# Patient Record
Sex: MALE | Race: WHITE | NOT HISPANIC OR LATINO | Employment: OTHER | URBAN - METROPOLITAN AREA
[De-identification: names, ages, dates, MRNs, and addresses within clinical notes are randomized per-mention and may not be internally consistent; named-entity substitution may affect disease eponyms.]

---

## 2017-02-14 ENCOUNTER — HOSPITAL ENCOUNTER (EMERGENCY)
Facility: OTHER | Age: 72
Discharge: HOME | End: 2017-02-14
Attending: EMERGENCY MEDICINE
Payer: MEDICARE

## 2017-02-14 VITALS
HEIGHT: 66 IN | OXYGEN SATURATION: 96 % | SYSTOLIC BLOOD PRESSURE: 148 MMHG | BODY MASS INDEX: 24.43 KG/M2 | WEIGHT: 152 LBS | HEART RATE: 71 BPM | TEMPERATURE: 99 F | RESPIRATION RATE: 18 BRPM | DIASTOLIC BLOOD PRESSURE: 80 MMHG

## 2017-02-14 DIAGNOSIS — H93.8X3 EAR CONGESTION, BILATERAL: Primary | ICD-10-CM

## 2017-02-14 DIAGNOSIS — H61.323: ICD-10-CM

## 2017-02-14 DIAGNOSIS — J06.9 VIRAL URI: ICD-10-CM

## 2017-02-14 PROCEDURE — 99283 EMERGENCY DEPT VISIT LOW MDM: CPT

## 2017-02-14 RX ORDER — ISOSORBIDE DINITRATE 30 MG/1
20 TABLET ORAL DAILY
COMMUNITY

## 2017-02-14 RX ORDER — NAPROXEN SODIUM 220 MG/1
81 TABLET, FILM COATED ORAL DAILY
COMMUNITY

## 2017-02-14 RX ORDER — GLIPIZIDE 2.5 MG/1
2.5 TABLET, EXTENDED RELEASE ORAL
COMMUNITY

## 2017-02-14 RX ORDER — METOPROLOL TARTRATE 25 MG/1
25 TABLET, FILM COATED ORAL 2 TIMES DAILY
COMMUNITY

## 2017-02-14 RX ORDER — CETIRIZINE HYDROCHLORIDE 10 MG/1
10 TABLET ORAL DAILY
Qty: 30 TABLET | Refills: 0 | Status: SHIPPED | OUTPATIENT
Start: 2017-02-14 | End: 2018-02-14

## 2017-02-14 RX ORDER — CIPROFLOXACIN AND DEXAMETHASONE 3; 1 MG/ML; MG/ML
4 SUSPENSION/ DROPS AURICULAR (OTIC) 2 TIMES DAILY
Qty: 7.5 ML | Refills: 0 | Status: SHIPPED | OUTPATIENT
Start: 2017-02-14 | End: 2017-02-19

## 2017-02-14 RX ORDER — ATORVASTATIN CALCIUM 20 MG/1
20 TABLET, FILM COATED ORAL DAILY
COMMUNITY

## 2017-02-14 RX ORDER — PANTOPRAZOLE SODIUM 40 MG/1
40 TABLET, DELAYED RELEASE ORAL DAILY
COMMUNITY

## 2017-02-14 RX ORDER — FLUTICASONE PROPIONATE 50 MCG
1 SPRAY, SUSPENSION (ML) NASAL NIGHTLY
Qty: 16 G | Refills: 1 | Status: SHIPPED | OUTPATIENT
Start: 2017-02-14 | End: 2017-03-16

## 2017-02-14 NOTE — ED AVS SNAPSHOT
[facility] EMERGENCY DEPARTMENT  [address] [facility] [address]               Alfonso Ayala   2017  6:35 PM   ED    Description:  Male : 1945   Department:  [facility] Emergency Department           Your Care was Coordinated By:     Provider Role From To    Tono London MD Attending Provider 17 --      Reason for Visit     Otalgia           Diagnoses this Visit        Comments    Ear congestion, bilateral    -  Primary     External ear canal stenosis, inflammatory, bilateral         Viral URI           ED Disposition     None           To Do List           Follow-up Information     Go to [facility] Emergency Department.    Specialty:  Emergency Medicine    Why:  As needed, If symptoms worsen    Contact information:    [address] [facility] [address]  [phone]        Schedule an appointment as soon as possible for a visit with Your PCP.    Why:  to follow up ED visit       These Medications        Disp Refills Start End    ciprofloxacin-dexamethasone 0.3-0.1% (CIPRODEX) 0.3-0.1 % DrpS 7.5 mL 0 2017    Place 4 drops into both ears 2 (two) times daily. - Both Ears    Pharmacy: [facility] [address] [place] [address] [place] AT [facility] Ph #: [phone]       fluticasone (FLONASE) 50 mcg/actuation nasal spray 16 g 1 2017 3/16/2017    1 spray by Each Nare route every evening. - Each Nare    Pharmacy: [facility] [address] [place] [address] [place] [address] [employer] [address] AT [facility] Ph #: [phone]       sodium chloride (OCEAN NASAL) 0.65 % nasal spray 1 Bottle 1 2017     1 spray by Nasal route as needed for Congestion. - Nasal    Pharmacy: [facility] [address] [place] [address] [place] [address] [facility] Ph #: [phone]       cetirizine (ZYRTEC) 10 MG tablet 30 tablet 0 2017    Take 1 tablet (10 mg total) by mouth  once daily. - Oral    Pharmacy: [facility] [id number] - [place]KS  [address] [facility] [place]  #: [phone]         [facility]     [facility] On Call Nurse Care Line -  Assistance  Registered nurses in the [facility] On Call Center provide clinical advisement, health education, appointment booking, and other advisory services.  Call for this free service at [phone].             Medications           Message regarding Medications     Verify the changes and/or additions to your medication regime listed below are the same as discussed with your clinician today.  If any of these changes or additions are incorrect, please notify your healthcare provider.        START taking these NEW medications        Refills    ciprofloxacin-dexamethasone 0.3-0.1% (CIPRODEX) 0.3-0.1 % DrpS 0    Sig: Place 4 drops into both ears 2 (two) times daily.    Class: Print    Route: Both Ears    fluticasone (FLONASE) 50 mcg/actuation nasal spray 1    Si spray by Each Nare route every evening.    Class: Print    Route: Each Nare    sodium chloride (OCEAN NASAL) 0.65 % nasal spray 1    Si spray by Nasal route as needed for Congestion.    Class: Print    Route: Nasal    cetirizine (ZYRTEC) 10 MG tablet 0    Sig: Take 1 tablet (10 mg total) by mouth once daily.    Class: Print    Route: Oral           Verify that the below list of medications is an accurate representation of the medications you are currently taking.  If none reported, the list may be blank. If incorrect, please contact your healthcare provider. Carry this list with you in case of emergency.           Current Medications     aspirin 81 MG Chew Take 81 mg by mouth once daily.    atorvastatin (LIPITOR) 20 MG tablet Take 20 mg by mouth once daily.    glipiZIDE (GLUCOTROL) 2.5 MG TR24 Take 2.5 mg by mouth daily with breakfast.    isosorbide dinitrate (ISORDIL) 30 MG Tab Take 20 mg by mouth once daily.    metoprolol tartrate  "(LOPRESSOR) 25 MG tablet Take 25 mg by mouth 2 (two) times daily.    pantoprazole (PROTONIX) 40 MG tablet Take 40 mg by mouth once daily.    cetirizine (ZYRTEC) 10 MG tablet Take 1 tablet (10 mg total) by mouth once daily.    ciprofloxacin-dexamethasone 0.3-0.1% (CIPRODEX) 0.3-0.1 % DrpS Place 4 drops into both ears 2 (two) times daily.    fluticasone (FLONASE) 50 mcg/actuation nasal spray 1 spray by Each Nare route every evening.    sodium chloride (OCEAN NASAL) 0.65 % nasal spray 1 spray by Nasal route as needed for Congestion.           Clinical Reference Information           Your Vitals Were     BP Pulse Temp Resp Height Weight    148/80 71 98.8 °F (37.1 °C) (Oral) 18 5' 6" (1.676 m) 68.9 kg (152 lb)    SpO2 BMI             96% 24.53 kg/m2         Allergies as of 2/14/2017        Reactions    Librium [Chlordiazepoxide Hcl] Hallucinations    Sulfa (Sulfonamide Antibiotics) Rash      Immunizations Administered on Date of Encounter - 2/14/2017     None      ED Micro, Lab, POCT     None      ED Imaging Orders     None        Discharge Instructions         Anatomy of the Ear    The ear is a complex and delicate organ. It collects sound waves so you can hear the world around you. The ear also has a second function--it helps you keep your balance. Your ear can be divided into 3 parts. The outer ear and middle ear help collect and amplify sound. The inner ear converts sound waves to messages that are sent to the brain. The inner ear also senses the movement and position of your head and body so you can maintain your balance and see clearly, even when you change positions.  The mastoid bone surrounds the middle ear. The external ear collects sound waves. The ear canal carries sound waves to the eardrum. The eardrum vibrates from sound waves, setting the middle ear bones in motion. The middle ear bones (ossicles) vibrate, transmitting sound waves to the inner ear. When the ear is healthy, air pressure remains balanced in " the middle ear. The eustachian tube helps control air pressure in the middle ear. The semicircular canals help maintain balance. The vestibular nerve carries balance signals to the brain. The auditory nerve carries sound signals to the brain. The cochlea picks up sound waves and makes nerve signals.     Date Last Reviewed: 10/1/2016  © 4580-1792 [employer]. [address] [place] [address]. All rights reserved. This information is not intended as a substitute for professional medical care. Always follow your healthcare professional's instructions.    In order to treat your post nasal drip, please adopt the following regime:     Use saline nasal spray by turning head to the side and inserting the nasal spray to the top nostril to form a seal and then flushing that nostril on top until saline comes out of the other, lower nostril. Repeat on other side. Do this 2-3 times daily, followed by administration of nasal steroid once daily.    Other symptomatic treatment includes taking a hot shower and letting the hot water beat down on your sinuses in your forehead and your cheeks, as well as on your chest to help break up the congestion worsened.    Additional medicine that can help your symptoms of sinus congestion include antihistamines, such as cetirizine/Zyrtec or loratadine/Allegra both of which are available over-the-counter.  If you do not have any history of high blood pressure, you can also add pseudoephedrine/Sudafed, a nasal decongestion for 3-5 days at the most.        Viral Upper Respiratory Illness (Adult)  You have a viral upper respiratory illness (URI), which is another term for the common cold. This illness is contagious during the first few days. It is spread through the air by coughing and sneezing. It may also be spread by direct contact (touching the sick person and then touching your own eyes, nose, or mouth). Frequent handwashing will decrease risk of spread. Most viral  illnesses go away within 7 to 10 days with rest and simple home remedies. Sometimes the illness may last for several weeks. Antibiotics will not kill a virus, and they are generally not prescribed for this condition.    Home care  · If symptoms are severe, rest at home for the first 2 to 3 days. When you resume activity, don't let yourself get too tired.  · Avoid being exposed to cigarette smoke (yours or others).  · You may use acetaminophen or ibuprofen to control pain and fever, unless another medicine was prescribed. (Note: If you have chronic liver or kidney disease, have ever had a stomach ulcer or gastrointestinal bleeding, or are taking blood-thinning medicines, talk with your healthcare provider before using these medicines.) Aspirin should never be given to anyone under 18 years of age who is ill with a viral infection or fever. It may cause severe liver or brain damage.  · Your appetite may be poor, so a light diet is fine. Avoid dehydration by drinking 6 to 8 glasses of fluids per day (water, soft drinks, juices, tea, or soup). Extra fluids will help loosen secretions in the nose and lungs.  · Over-the-counter cold medicines will not shorten the length of time youre sick, but they may be helpful for the following symptoms: cough, sore throat, and nasal and sinus congestion. (Note: Do not use decongestants if you have high blood pressure.)  Follow-up care  Follow up with your healthcare provider, or as advised.  When to seek medical advice  Call your healthcare provider right away if any of these occur:  · Cough with lots of colored sputum (mucus)  · Severe headache; face, neck, or ear pain  · Difficulty swallowing due to throat pain  · Fever of 100.4°F (38°C)  Call 911, or get immediate medical care  Call emergency services right away if any of these occur:  · Chest pain, shortness of breath, wheezing, or difficulty breathing  · Coughing up blood  · Inability to swallow due to throat pain  Date Last  Reviewed: 9/13/2015  © 1832-8309 [employer]. [address] [place] [address], [place] [address]. All rights reserved. This information is not intended as a substitute for professional medical care. Always follow your healthcare professional's instructions.          [facility] Sign-Up     Activating your [facility] account is as easy as 1-2-3!     1) Visit [email or web address], select Sign Up Now, enter this activation code and your date of birth, then select Next.  [email or web address]  Expires: 3/31/2017  8:05 PM      2) Create a username and password to use when you visit [facility] in the future and select a security question in case you lose your password and select Next.    3) Enter your e-mail address and click Sign Up!    Additional Information  If you have questions, please e-mail [email or web address] or call [phone] to talk to our [facility] staff. Remember, [facility] is NOT to be used for urgent needs. For medical emergencies, dial 911.         Smoking Cessation     If you would like to quit smoking:   You may be eligible for free services if you are a [place] resident and started smoking cigarettes before September 1, 1988.  Call the Smoking Cessation Trust (SCT) toll free at (892) 318-5424 or (762) 292-5777.   Call 4-412-QUIT-NOW if you do not meet the above criteria.             [facility] Emergency Department complies with applicable Federal civil rights laws and does not discriminate on the basis of race, color, national origin, age, disability, or sex.        Language Assistance Services     ATTENTION: Language assistance services are available, free of charge. Please call [phone].      ATENCIÓN: Si habla español, tiene a joe disposición servicios gratuitos de asistencia lingüística. Llame al [phone].     CHÚ Ý: N?u b?n nói Ti?ng Vi?t, có các d?ch v? h? tr? ngôn ng? mi?n phí dành cho b?n. G?i s? [phone].

## 2017-02-15 NOTE — ED PROVIDER NOTES
"Encounter Date: 2/14/2017       History     Chief Complaint   Patient presents with    Otalgia     reports bilateral ear "swooshing" since thursday      Review of patient's allergies indicates:   Allergen Reactions    Librium [chlordiazepoxide hcl] Hallucinations    Sulfa (sulfonamide antibiotics) Rash     HPI Comments: Patient presenting with bilateral ear fullness, with a "whooshing" sound for the past several days, worse on the right than the left.  He denies any recent air travel, swelling or placing anything sharp in his ear.  He denies any decreased hearing or vertigo.    The history is provided by the patient.     Past Medical History   Diagnosis Date    Diabetes mellitus     Heart attack      No past medical history pertinent negatives.  Past Surgical History   Procedure Laterality Date    Coronary artery bypass graft       x 2     Stomach surgery       x2 (1972, 1997)     History reviewed. No pertinent family history.  Social History   Substance Use Topics    Smoking status: Current Every Day Smoker    Smokeless tobacco: None    Alcohol use No     Review of Systems   Constitutional: Negative for fever.   HENT: Positive for rhinorrhea. Negative for ear discharge, ear pain and facial swelling.    Eyes: Negative for redness.   Respiratory: Negative for shortness of breath.    Cardiovascular: Negative for chest pain.   Gastrointestinal: Negative for vomiting.   Musculoskeletal: Negative for gait problem.   Skin: Negative for pallor.   Neurological: Negative for facial asymmetry.   Psychiatric/Behavioral: Negative for confusion.   All other systems reviewed and are negative.      Physical Exam   Initial Vitals   BP Pulse Resp Temp SpO2   02/14/17 1838 02/14/17 1838 02/14/17 1838 02/14/17 1838 02/14/17 1838   148/80 71 18 98.8 °F (37.1 °C) 96 %     Physical Exam    Nursing note and vitals reviewed.  Constitutional: He appears well-developed and well-nourished. He is not diaphoretic.   Well-appearing " older white male in no acute distress.   HENT:   Head: Normocephalic and atraumatic.   Right Ear: There is swelling. No tenderness. No mastoid tenderness. Tympanic membrane is not injected, not perforated and not erythematous. A middle ear effusion is present.   Left Ear: There is swelling. No tenderness. No mastoid tenderness. Tympanic membrane is not perforated and not erythematous. A middle ear effusion is present.   Ears:    Eyes: EOM are normal.   Neck: Normal range of motion. Neck supple.   Pulmonary/Chest: No respiratory distress.   Abdominal: Soft. He exhibits no distension. There is no rebound.   Musculoskeletal: Normal range of motion. He exhibits no edema.   Neurological: He is alert and oriented to person, place, and time.   Skin: Skin is warm and dry.   Psychiatric: He has a normal mood and affect.         ED Course   Procedures  Labs Reviewed - No data to display          Medical Decision Making:   Initial Assessment:   Patient presenting with nasal congestion and ear fullness.  Differential Diagnosis:   Acute otitis media, acute otitis externa, ear congestion, ruptured tympanic membrane, vestibular neuritis, viral URI.  ED Management:  Although the patient does not have overt otitis external on exam, he does have significant external auditory meatus swelling, that would likely benefit from steroids.  Therefore will treat empirically with a course of Ciprodex drops as well as an antihistamine and discharged with outpatient follow-up.    Tono London MD,   Emergency Medicine  02/15/2017 4:38 AM    (This note was written with voice recognition software.  Please excuse any grammatical errors.)                    ED Course     Clinical Impression:   The primary encounter diagnosis was Ear congestion, bilateral. Diagnoses of External ear canal stenosis, inflammatory, bilateral and Viral URI were also pertinent to this visit.          Tono London MD  02/15/17 0438

## 2017-02-15 NOTE — ED NOTES
"Ear Pain: Patient presents with bilateral ear pain, 2/10.  Symptoms include "swooshing sound" , denies hearing loss or any other symptom. Symptoms began 5 days ago and are unchanged since that time. Patient denies nonproductive cough. Ear history: 0 previous ear infections.     "

## 2017-02-15 NOTE — DISCHARGE INSTRUCTIONS
Anatomy of the Ear    The ear is a complex and delicate organ. It collects sound waves so you can hear the world around you. The ear also has a second function--it helps you keep your balance. Your ear can be divided into 3 parts. The outer ear and middle ear help collect and amplify sound. The inner ear converts sound waves to messages that are sent to the brain. The inner ear also senses the movement and position of your head and body so you can maintain your balance and see clearly, even when you change positions.  The mastoid bone surrounds the middle ear. The external ear collects sound waves. The ear canal carries sound waves to the eardrum. The eardrum vibrates from sound waves, setting the middle ear bones in motion. The middle ear bones (ossicles) vibrate, transmitting sound waves to the inner ear. When the ear is healthy, air pressure remains balanced in the middle ear. The eustachian tube helps control air pressure in the middle ear. The semicircular canals help maintain balance. The vestibular nerve carries balance signals to the brain. The auditory nerve carries sound signals to the brain. The cochlea picks up sound waves and makes nerve signals.     Date Last Reviewed: 10/1/2016  © 2341-4081 [employer]. [address] [place] [address], [place] [address]. All rights reserved. This information is not intended as a substitute for professional medical care. Always follow your healthcare professional's instructions.    In order to treat your post nasal drip, please adopt the following regime:     Use saline nasal spray by turning head to the side and inserting the nasal spray to the top nostril to form a seal and then flushing that nostril on top until saline comes out of the other, lower nostril. Repeat on other side. Do this 2-3 times daily, followed by administration of nasal steroid once daily.    Other symptomatic treatment includes taking a hot shower and letting the hot water beat down on  your sinuses in your forehead and your cheeks, as well as on your chest to help break up the congestion worsened.    Additional medicine that can help your symptoms of sinus congestion include antihistamines, such as cetirizine/Zyrtec or loratadine/Allegra both of which are available over-the-counter.  If you do not have any history of high blood pressure, you can also add pseudoephedrine/Sudafed, a nasal decongestion for 3-5 days at the most.        Viral Upper Respiratory Illness (Adult)  You have a viral upper respiratory illness (URI), which is another term for the common cold. This illness is contagious during the first few days. It is spread through the air by coughing and sneezing. It may also be spread by direct contact (touching the sick person and then touching your own eyes, nose, or mouth). Frequent handwashing will decrease risk of spread. Most viral illnesses go away within 7 to 10 days with rest and simple home remedies. Sometimes the illness may last for several weeks. Antibiotics will not kill a virus, and they are generally not prescribed for this condition.    Home care  · If symptoms are severe, rest at home for the first 2 to 3 days. When you resume activity, don't let yourself get too tired.  · Avoid being exposed to cigarette smoke (yours or others).  · You may use acetaminophen or ibuprofen to control pain and fever, unless another medicine was prescribed. (Note: If you have chronic liver or kidney disease, have ever had a stomach ulcer or gastrointestinal bleeding, or are taking blood-thinning medicines, talk with your healthcare provider before using these medicines.) Aspirin should never be given to anyone under 18 years of age who is ill with a viral infection or fever. It may cause severe liver or brain damage.  · Your appetite may be poor, so a light diet is fine. Avoid dehydration by drinking 6 to 8 glasses of fluids per day (water, soft drinks, juices, tea, or soup). Extra fluids  will help loosen secretions in the nose and lungs.  · Over-the-counter cold medicines will not shorten the length of time youre sick, but they may be helpful for the following symptoms: cough, sore throat, and nasal and sinus congestion. (Note: Do not use decongestants if you have high blood pressure.)  Follow-up care  Follow up with your healthcare provider, or as advised.  When to seek medical advice  Call your healthcare provider right away if any of these occur:  · Cough with lots of colored sputum (mucus)  · Severe headache; face, neck, or ear pain  · Difficulty swallowing due to throat pain  · Fever of 100.4°F (38°C)  Call 911, or get immediate medical care  Call emergency services right away if any of these occur:  · Chest pain, shortness of breath, wheezing, or difficulty breathing  · Coughing up blood  · Inability to swallow due to throat pain  Date Last Reviewed: 9/13/2015  © 5378-9439 [employer]. [address] [place] [address], [place] [address]. All rights reserved. This information is not intended as a substitute for professional medical care. Always follow your healthcare professional's instructions.